# Patient Record
Sex: FEMALE | Race: WHITE | NOT HISPANIC OR LATINO | URBAN - METROPOLITAN AREA
[De-identification: names, ages, dates, MRNs, and addresses within clinical notes are randomized per-mention and may not be internally consistent; named-entity substitution may affect disease eponyms.]

---

## 2020-02-18 ENCOUNTER — OUTPATIENT (OUTPATIENT)
Dept: OUTPATIENT SERVICES | Facility: HOSPITAL | Age: 70
LOS: 1 days | End: 2020-02-18
Payer: MEDICARE

## 2020-02-18 DIAGNOSIS — H26.491 OTHER SECONDARY CATARACT, RIGHT EYE: ICD-10-CM

## 2020-02-18 PROCEDURE — 66821 AFTER CATARACT LASER SURGERY: CPT | Mod: RT

## 2020-06-22 ENCOUNTER — APPOINTMENT (OUTPATIENT)
Dept: HEMATOLOGY ONCOLOGY | Facility: CLINIC | Age: 70
End: 2020-06-22

## 2020-07-08 ENCOUNTER — APPOINTMENT (OUTPATIENT)
Dept: HEMATOLOGY ONCOLOGY | Facility: CLINIC | Age: 70
End: 2020-07-08

## 2020-11-17 ENCOUNTER — APPOINTMENT (OUTPATIENT)
Dept: HEMATOLOGY ONCOLOGY | Facility: CLINIC | Age: 70
End: 2020-11-17
Payer: MEDICARE

## 2020-11-17 DIAGNOSIS — I10 ESSENTIAL (PRIMARY) HYPERTENSION: ICD-10-CM

## 2020-11-17 DIAGNOSIS — Z82.49 FAMILY HISTORY OF ISCHEMIC HEART DISEASE AND OTHER DISEASES OF THE CIRCULATORY SYSTEM: ICD-10-CM

## 2020-11-17 DIAGNOSIS — D58.2 OTHER HEMOGLOBINOPATHIES: ICD-10-CM

## 2020-11-17 DIAGNOSIS — Z83.3 FAMILY HISTORY OF DIABETES MELLITUS: ICD-10-CM

## 2020-11-17 DIAGNOSIS — E11.9 TYPE 2 DIABETES MELLITUS W/OUT COMPLICATIONS: ICD-10-CM

## 2020-11-17 DIAGNOSIS — C50.919 MALIGNANT NEOPLASM OF UNSPECIFIED SITE OF UNSPECIFIED FEMALE BREAST: ICD-10-CM

## 2020-11-17 DIAGNOSIS — E78.00 PURE HYPERCHOLESTEROLEMIA, UNSPECIFIED: ICD-10-CM

## 2020-11-17 DIAGNOSIS — Z80.3 FAMILY HISTORY OF MALIGNANT NEOPLASM OF BREAST: ICD-10-CM

## 2020-11-17 PROCEDURE — 99205 OFFICE O/P NEW HI 60 MIN: CPT | Mod: 95

## 2020-11-17 NOTE — RESULTS/DATA
[FreeTextEntry1] : CBC from 10/17/20:\par WBC 5.34, Hgb 16.9, Hematocrit 51.9, Plt 138, ALC 1.02, ANC 3.23

## 2020-11-17 NOTE — PHYSICAL EXAM
[Fully active, able to carry on all pre-disease performance without restriction] : Status 0 - Fully active, able to carry on all pre-disease performance without restriction [Normal] : affect appropriate [de-identified] : speaking in full sentences [de-identified] : aox3

## 2020-11-17 NOTE — ASSESSMENT
[FreeTextEntry1] : 70F hx left sided Breast ca in 2006 s/p chemotherapy (taxotere, methotrexate and 5FU), radiation and b/l mastectomies in 2012, diabetes, htn, and hypercholesterolemia presenting for an initial consultation for elevated hemoglobin level. \par \par Patient's hemoglobin as of 10/17/20 is mildly elevated above the upper limit of normal. Discussed with the patient that her hemoglobin being elevated is likely secondary to dehydration given clinical presentation and history of fluctuating levels this year. She recently started to change her daily intake of fluid after the most recent CBC and is currently drinking 3 bottles of 64-oz water bottles a day. We recommended daily hydration with 4 water bottles per day, and repeating her labs on December 7th in 3 weeks time. She will then have a followup telehealth visit on December 8th with us. Based on the levels at that time, will determine if additional workup is warranted. \par All questions and concerns addressed to the best of my ability.

## 2020-11-17 NOTE — REVIEW OF SYSTEMS
[Negative] : Allergic/Immunologic [Depression] : depression [Fever] : no fever [Chills] : no chills [Night Sweats] : no night sweats [Fatigue] : no fatigue [Recent Change In Weight] : ~T no recent weight change

## 2020-11-17 NOTE — HISTORY OF PRESENT ILLNESS
[Home] : at home, [unfilled] , at the time of the visit. [Medical Office: ([facility])___] : at the medical office located in  [Verbal consent obtained from patient] : the patient, [unfilled] [de-identified] : 70F hx left sided Breast ca in 2006 s/p chemotherapy (taxotere, methotrexate and 5FU), radiation and b/l mastectomies in 2012, diabetes, htn and hypercholesterolemia presenting for an initial consultation for elevated hemoglobin level. \par Patient reports going for routine blood work in Feb 2020, where she was found to have an elevated hemoglobin. At that time, her PCP recommended to monitor it, and the level fluctuated on subsequent blood draws. The most recent blood draw on 10/17/20 showed that her hgb was 16.9 with hematocrit 51.9. Pt endorses that she has been dehydrated lately and has not been drinking much water. She has recently started to hydrate more which resulted in an improvement in her creatinine. She otherwise denies any headaches, changes in vision, shortness of breath or snoring. She reports her O2 sat at home is 97%. She has no abd pains. She is a non-smoker. Fatigue and appetite levels are good. No recent changes in weight. She denies pruritus when she takes a shower. Denies recent weight loss.\par

## 2020-12-06 ENCOUNTER — LABORATORY RESULT (OUTPATIENT)
Age: 70
End: 2020-12-06

## 2020-12-08 ENCOUNTER — APPOINTMENT (OUTPATIENT)
Dept: HEMATOLOGY ONCOLOGY | Facility: CLINIC | Age: 70
End: 2020-12-08
Payer: MEDICARE

## 2020-12-08 LAB
ALBUMIN SERPL ELPH-MCNC: 4 G/DL
ALP BLD-CCNC: 155 U/L
ALT SERPL-CCNC: 35 U/L
ANION GAP SERPL CALC-SCNC: 12 MMOL/L
AST SERPL-CCNC: 31 U/L
BASOPHILS # BLD AUTO: 0.06 K/UL
BASOPHILS NFR BLD AUTO: 0.7 %
BILIRUB SERPL-MCNC: 0.9 MG/DL
BUN SERPL-MCNC: 14 MG/DL
CA-I SERPL-SCNC: 1.35 MMOL/L
CALCIUM SERPL-MCNC: 10.3 MG/DL
CHLORIDE SERPL-SCNC: 99 MMOL/L
CO2 SERPL-SCNC: 31 MMOL/L
CREAT SERPL-MCNC: 1.41 MG/DL
EOSINOPHIL # BLD AUTO: 0.12 K/UL
EOSINOPHIL NFR BLD AUTO: 1.5 %
GLUCOSE SERPL-MCNC: 51 MG/DL
HCT VFR BLD CALC: 55.1 %
HGB BLD-MCNC: 17 G/DL
IMM GRANULOCYTES NFR BLD AUTO: 0.4 %
LDH SERPL-CCNC: 255 U/L
LYMPHOCYTES # BLD AUTO: 1.06 K/UL
LYMPHOCYTES NFR BLD AUTO: 13 %
MAGNESIUM SERPL-MCNC: 2.1 MG/DL
MAN DIFF?: NORMAL
MCHC RBC-ENTMCNC: 30.9 GM/DL
MCHC RBC-ENTMCNC: 31.8 PG
MCV RBC AUTO: 103.2 FL
MONOCYTES # BLD AUTO: 0.76 K/UL
MONOCYTES NFR BLD AUTO: 9.3 %
NEUTROPHILS # BLD AUTO: 6.14 K/UL
NEUTROPHILS NFR BLD AUTO: 75.1 %
PHOSPHATE SERPL-MCNC: 3.5 MG/DL
PLATELET # BLD AUTO: 137 K/UL
POTASSIUM SERPL-SCNC: 5.1 MMOL/L
PROT SERPL-MCNC: 7.8 G/DL
RBC # BLD: 5.34 M/UL
RBC # FLD: 15 %
SODIUM SERPL-SCNC: 142 MMOL/L
WBC # FLD AUTO: 8.17 K/UL

## 2020-12-08 PROCEDURE — 99215 OFFICE O/P EST HI 40 MIN: CPT | Mod: 95

## 2020-12-08 NOTE — END OF VISIT
normal... Well appearing, awake, alert, oriented to person, place, time/situation and in no apparent distress. [Time Spent: ___ minutes] : I have spent [unfilled] minutes of time on the encounter. [>50% of the face to face encounter time was spent on counseling and/or coordination of care for ___] : Greater than 50% of the face to face encounter time was spent on counseling and/or coordination of care for [unfilled]

## 2020-12-14 ENCOUNTER — RESULT REVIEW (OUTPATIENT)
Age: 70
End: 2020-12-14

## 2020-12-14 ENCOUNTER — APPOINTMENT (OUTPATIENT)
Dept: CT IMAGING | Facility: CLINIC | Age: 70
End: 2020-12-14
Payer: MEDICARE

## 2020-12-14 ENCOUNTER — OUTPATIENT (OUTPATIENT)
Dept: OUTPATIENT SERVICES | Facility: HOSPITAL | Age: 70
LOS: 1 days | End: 2020-12-14
Payer: MEDICARE

## 2020-12-14 DIAGNOSIS — D58.2 OTHER HEMOGLOBINOPATHIES: ICD-10-CM

## 2020-12-14 PROCEDURE — 74177 CT ABD & PELVIS W/CONTRAST: CPT | Mod: 26

## 2020-12-14 PROCEDURE — 82565 ASSAY OF CREATININE: CPT

## 2020-12-14 PROCEDURE — 74177 CT ABD & PELVIS W/CONTRAST: CPT

## 2020-12-14 NOTE — REVIEW OF SYSTEMS
[Depression] : depression [Negative] : Allergic/Immunologic [Fever] : no fever [Chills] : no chills [Night Sweats] : no night sweats [Fatigue] : no fatigue [Recent Change In Weight] : ~T no recent weight change

## 2020-12-14 NOTE — PHYSICAL EXAM
[Fully active, able to carry on all pre-disease performance without restriction] : Status 0 - Fully active, able to carry on all pre-disease performance without restriction [Normal] : affect appropriate [de-identified] : speaking in full sentences [de-identified] : aox3

## 2020-12-14 NOTE — ASSESSMENT
[FreeTextEntry1] : 70F hx left sided Breast ca in 2006 s/p chemotherapy (taxotere, methotrexate and 5FU), radiation and b/l mastectomies in 2012, diabetes, htn, and hypercholesterolemia presenting for follow-up elevated hemoglobin level. after a recent hydration trial. Her labs continue to be elevated, as such, we need to do a formal work-up to rule out PV and other MPD. I have added the following labs: CASSANDRA 2 and epo levels.\par All questions and concerns addressed to the best of my ability. We will talk again next week once results are back.

## 2020-12-14 NOTE — HISTORY OF PRESENT ILLNESS
[Home] : at home, [unfilled] , at the time of the visit. [Medical Office: ([facility])___] : at the medical office located in  [Verbal consent obtained from patient] : the patient, [unfilled] [de-identified] : 70F hx left sided Breast ca in 2006 s/p chemotherapy (taxotere, methotrexate and 5FU), radiation and b/l mastectomies in 2012, diabetes, htn, and hypercholesterolemia presenting for f/u eval for elevated hemoglobin level after initial trial of hydration for a couple of weeks.\par History of present illness: Patient reports going for routine blood work in Feb 2020, where she was found to have an elevated hemoglobin. At that time, her PCP recommended to monitor it, and the level fluctuated on subsequent blood draws. Blood draw on 10/17/20 showed that her hgb was 16.9 with hematocrit 51.9. Pt reported being dehydrated and not drinking much water. She denied any headaches, changes in vision, shortness of breath, or snoring while sleeping. She reports her O2 sat at home is 97%. She has no abd pains. She is a non-smoker. Fatigue and appetite levels are good. No recent changes in weight. She denies pruritus when she takes a shower. Denies recent weight loss.\par  [Spouse] : spouse

## 2020-12-16 ENCOUNTER — APPOINTMENT (OUTPATIENT)
Dept: HEMATOLOGY ONCOLOGY | Facility: CLINIC | Age: 70
End: 2020-12-16

## 2020-12-18 ENCOUNTER — TRANSCRIPTION ENCOUNTER (OUTPATIENT)
Age: 70
End: 2020-12-18

## 2020-12-18 ENCOUNTER — LABORATORY RESULT (OUTPATIENT)
Age: 70
End: 2020-12-18

## 2020-12-18 ENCOUNTER — APPOINTMENT (OUTPATIENT)
Dept: HEMATOLOGY ONCOLOGY | Facility: CLINIC | Age: 70
End: 2020-12-18
Payer: MEDICARE

## 2020-12-18 PROCEDURE — 99442: CPT | Mod: 95

## 2020-12-21 ENCOUNTER — LABORATORY RESULT (OUTPATIENT)
Age: 70
End: 2020-12-21

## 2020-12-22 ENCOUNTER — LABORATORY RESULT (OUTPATIENT)
Age: 70
End: 2020-12-22

## 2020-12-23 ENCOUNTER — LABORATORY RESULT (OUTPATIENT)
Age: 70
End: 2020-12-23

## 2020-12-23 LAB
ALP BLD-CCNC: 152 U/L
CA-I SERPL-SCNC: 1.38 MMOL/L
VIT B12 SERPL-MCNC: 806 PG/ML

## 2020-12-28 ENCOUNTER — APPOINTMENT (OUTPATIENT)
Dept: HEMATOLOGY ONCOLOGY | Facility: CLINIC | Age: 70
End: 2020-12-28
Payer: MEDICARE

## 2020-12-28 PROCEDURE — 99215 OFFICE O/P EST HI 40 MIN: CPT | Mod: 95

## 2020-12-28 NOTE — HISTORY OF PRESENT ILLNESS
[Home] : at home, [unfilled] , at the time of the visit. [Medical Office: ([facility])___] : at the medical office located in  [Spouse] : spouse [Verbal consent obtained from patient] : the patient, [unfilled] [de-identified] : 70F hx left sided Breast ca in 2006 s/p chemotherapy (taxotere, methotrexate and 5FU), radiation and b/l mastectomies in 2012, diabetes, htn, and hypercholesterolemia presenting for f/u eval for elevated hemoglobin level.\par History of present illness: Patient reports going for routine blood work in Feb 2020, where she was found to have an elevated hemoglobin. At that time, her PCP recommended to monitor it, and the level fluctuated on subsequent blood draws. Blood draw on 10/17/20 showed that her hgb was 16.9 with hematocrit 51.9. She denied any headaches, changes in vision, shortness of breath, or snoring while sleeping. She reports her O2 sat at home is 97%. She has no abd pains. She is a non-smoker and not exposed to second hand smoke. Fatigue and appetite levels are good. No recent changes in weight. She denies pruritus when she takes a shower. Denies recent weight loss.\par

## 2020-12-28 NOTE — PHYSICAL EXAM
[Fully active, able to carry on all pre-disease performance without restriction] : Status 0 - Fully active, able to carry on all pre-disease performance without restriction [Normal] : affect appropriate [de-identified] : speaking in full sentences [de-identified] : anicteric [de-identified] : aox3

## 2020-12-28 NOTE — REVIEW OF SYSTEMS
[Negative] : Allergic/Immunologic [Fever] : no fever [Chills] : no chills [Night Sweats] : no night sweats [Fatigue] : no fatigue [Recent Change In Weight] : ~T no recent weight change

## 2020-12-31 LAB — T(9;22)(ABL1,BCR)/CONTROL BLD/T: NORMAL

## 2021-01-07 ENCOUNTER — APPOINTMENT (OUTPATIENT)
Dept: HEMATOLOGY ONCOLOGY | Facility: CLINIC | Age: 71
End: 2021-01-07
Payer: MEDICARE

## 2021-01-07 PROCEDURE — 99442: CPT | Mod: 95

## 2021-01-09 ENCOUNTER — APPOINTMENT (OUTPATIENT)
Dept: MRI IMAGING | Facility: CLINIC | Age: 71
End: 2021-01-09

## 2021-01-13 ENCOUNTER — APPOINTMENT (OUTPATIENT)
Dept: HEMATOLOGY ONCOLOGY | Facility: CLINIC | Age: 71
End: 2021-01-13

## 2021-04-13 ENCOUNTER — APPOINTMENT (OUTPATIENT)
Dept: HEMATOLOGY ONCOLOGY | Facility: CLINIC | Age: 71
End: 2021-04-13
Payer: MEDICARE

## 2021-04-13 PROCEDURE — 99214 OFFICE O/P EST MOD 30 MIN: CPT | Mod: 95

## 2021-05-16 NOTE — PHYSICAL EXAM
[Fully active, able to carry on all pre-disease performance without restriction] : Status 0 - Fully active, able to carry on all pre-disease performance without restriction [Normal] : affect appropriate [de-identified] : speaking in full sentences. Weight 174#, height 5 feet 4. BMI 30 [de-identified] : anicteric [de-identified] : aox3

## 2021-05-16 NOTE — HISTORY OF PRESENT ILLNESS
[Home] : at home, [unfilled] , at the time of the visit. [Medical Office: ([facility])___] : at the medical office located in  [Verbal consent obtained from patient] : the patient, [unfilled] [de-identified] : 71F hx left sided Breast ca in 2006 s/p chemotherapy (taxotere, methotrexate and 5FU), radiation and b/l mastectomies in 2012, diabetes, htn, and hypercholesterolemia presenting for f/u eval for elevated hemoglobin level of unclear etiology.\par \par History of present illness: Patient reports going for routine blood work in Feb 2020, where she was found to have an elevated hemoglobin. At that time, her PCP recommended to monitor it, and the level fluctuated on subsequent blood draws. Blood draw on 10/17/20 showed that her hgb was 16.9 with hematocrit 51.9. She denied any headaches, changes in vision, shortness of breath, or snoring while sleeping. She reports her O2 sat at home is 97%. She has no abd pains. She is a non-smoker and not exposed to second hand smoke. Fatigue and appetite levels are good. No recent changes in weight. She denies pruritus when she takes a shower. Denies recent weight loss. Due to ongoing pandemic, she has declined to do further work-up at this time. She prefers to wait until the pandemic is better controlled since she is asymptomatic. She received both doses of COVID vaccines, second dose 2/19/21.\par

## 2021-07-21 ENCOUNTER — APPOINTMENT (OUTPATIENT)
Dept: HEMATOLOGY ONCOLOGY | Facility: CLINIC | Age: 71
End: 2021-07-21
Payer: MEDICARE

## 2021-07-21 VITALS
SYSTOLIC BLOOD PRESSURE: 144 MMHG | HEART RATE: 76 BPM | BODY MASS INDEX: 30.39 KG/M2 | WEIGHT: 178 LBS | HEIGHT: 64 IN | TEMPERATURE: 98.2 F | OXYGEN SATURATION: 91 % | DIASTOLIC BLOOD PRESSURE: 74 MMHG

## 2021-07-21 DIAGNOSIS — E03.9 HYPOTHYROIDISM, UNSPECIFIED: ICD-10-CM

## 2021-07-21 DIAGNOSIS — Z00.00 ENCOUNTER FOR GENERAL ADULT MEDICAL EXAMINATION W/OUT ABNORMAL FINDINGS: ICD-10-CM

## 2021-07-21 DIAGNOSIS — F41.9 ANXIETY DISORDER, UNSPECIFIED: ICD-10-CM

## 2021-07-21 DIAGNOSIS — N28.89 OTHER SPECIFIED DISORDERS OF KIDNEY AND URETER: ICD-10-CM

## 2021-07-21 PROCEDURE — 99215 OFFICE O/P EST HI 40 MIN: CPT

## 2021-07-21 PROCEDURE — 85025 COMPLETE CBC W/AUTO DIFF WBC: CPT

## 2021-07-21 RX ORDER — LEVOTHYROXINE SODIUM 88 UG/1
88 TABLET ORAL
Refills: 0 | Status: ACTIVE | COMMUNITY
Start: 2020-11-17

## 2021-07-21 RX ORDER — ASPIRIN ENTERIC COATED TABLETS 81 MG 81 MG/1
81 TABLET, DELAYED RELEASE ORAL
Qty: 90 | Refills: 1 | Status: ACTIVE | COMMUNITY
Start: 2020-11-17

## 2021-07-21 RX ORDER — METOPROLOL SUCCINATE 200 MG/1
200 TABLET, EXTENDED RELEASE ORAL
Refills: 0 | Status: ACTIVE | COMMUNITY
Start: 2020-11-17

## 2021-07-21 RX ORDER — UBIDECARENONE 60 MG
60 CAPSULE ORAL
Refills: 0 | Status: ACTIVE | COMMUNITY
Start: 2021-07-21

## 2021-07-21 RX ORDER — CHROMIUM 200 MCG
25 MCG TABLET ORAL
Refills: 0 | Status: ACTIVE | COMMUNITY
Start: 2021-07-21

## 2021-07-21 RX ORDER — FUROSEMIDE 20 MG/1
20 TABLET ORAL
Refills: 0 | Status: ACTIVE | COMMUNITY
Start: 2021-07-21

## 2021-07-21 RX ORDER — DULOXETINE HCL 100 %
POWDER (GRAM) MISCELLANEOUS
Refills: 0 | Status: ACTIVE | COMMUNITY
Start: 2020-11-17

## 2021-07-21 RX ORDER — INSULIN LISPRO 100 [IU]/ML
100 INJECTION, SOLUTION INTRAVENOUS; SUBCUTANEOUS
Refills: 0 | Status: ACTIVE | COMMUNITY
Start: 2020-11-17

## 2021-07-21 RX ORDER — DILTIAZEM HYDROCHLORIDE 360 MG/1
360 CAPSULE, COATED, EXTENDED RELEASE ORAL
Refills: 0 | Status: ACTIVE | COMMUNITY
Start: 2020-11-17

## 2021-07-21 RX ORDER — ROSUVASTATIN CALCIUM 20 MG/1
20 TABLET, FILM COATED ORAL
Refills: 0 | Status: ACTIVE | COMMUNITY
Start: 2020-11-17

## 2021-07-21 RX ORDER — FOLIC ACID/MULTIVIT,IRON,MINER .4-18-35
TABLET,CHEWABLE ORAL
Refills: 0 | Status: ACTIVE | COMMUNITY
Start: 2021-07-21

## 2021-07-21 RX ORDER — OMEGA-3/DHA/EPA/FISH OIL 300-1000MG
1000 CAPSULE ORAL
Refills: 0 | Status: ACTIVE | COMMUNITY
Start: 2021-07-21

## 2021-07-21 RX ORDER — LOSARTAN POTASSIUM 50 MG/1
50 TABLET, FILM COATED ORAL
Refills: 0 | Status: ACTIVE | COMMUNITY
Start: 2021-07-21

## 2021-07-25 LAB
ALBUMIN SERPL ELPH-MCNC: 3.9 G/DL
ALBUMIN SERPL ELPH-MCNC: 4 G/DL
ALP BLD-CCNC: 129 U/L
ALP BLD-CCNC: 136 U/L
ALT SERPL-CCNC: 30 U/L
ALT SERPL-CCNC: 35 U/L
ANION GAP SERPL CALC-SCNC: 11 MMOL/L
ANION GAP SERPL CALC-SCNC: 7 MMOL/L
AST SERPL-CCNC: 29 U/L
AST SERPL-CCNC: 37 U/L
BILIRUB SERPL-MCNC: 0.8 MG/DL
BILIRUB SERPL-MCNC: 0.9 MG/DL
BUN SERPL-MCNC: 15 MG/DL
BUN SERPL-MCNC: 17 MG/DL
CALCIUM SERPL-MCNC: 10.4 MG/DL
CALCIUM SERPL-MCNC: 10.7 MG/DL
CHLORIDE SERPL-SCNC: 101 MMOL/L
CHLORIDE SERPL-SCNC: 102 MMOL/L
CO2 SERPL-SCNC: 29 MMOL/L
CO2 SERPL-SCNC: 32 MMOL/L
CREAT SERPL-MCNC: 1.44 MG/DL
CREAT SERPL-MCNC: 1.5 MG/DL
GLUCOSE SERPL-MCNC: 108 MG/DL
GLUCOSE SERPL-MCNC: 160 MG/DL
LDH SERPL-CCNC: 233 U/L
MAGNESIUM SERPL-MCNC: 2.3 MG/DL
PHOSPHATE SERPL-MCNC: 3.2 MG/DL
POTASSIUM SERPL-SCNC: 5.2 MMOL/L
POTASSIUM SERPL-SCNC: 5.5 MMOL/L
POTASSIUM SERPL-SCNC: 6.3 MMOL/L
PROT SERPL-MCNC: 7.3 G/DL
PROT SERPL-MCNC: 7.4 G/DL
SODIUM SERPL-SCNC: 141 MMOL/L
SODIUM SERPL-SCNC: 141 MMOL/L
URATE SERPL-MCNC: 6 MG/DL

## 2021-07-26 NOTE — ASSESSMENT
[FreeTextEntry1] : 71F hx left sided Breast ca in 2006 s/p chemotherapy (taxotere, methotrexate and 5FU), radiation and b/l mastectomies in 2011, diabetes, HTN, hypercholesterolemia, and anxiety presenting for follow up evaluation of erythrocytosis, r/o polycythemia.\par \par JAK2 V617F mutation negative in 12/6/21. Her EPO levels are normal (high range). Counts today are stable, but Hgb remains elevated. She reports hx of low plts following chemotherapy for her breast caner.\par \par #Polycythemia\par -Previously discussed conducting work up to r/o secondary polycythemia; notably, her O2Sat was in the low 90s in clinic. Ms Sy attributes this to holding her breath due to anxiety when having her vitals checked, but it raised the suspicion that she may have component of underlying oxygenation defect (either pulmonary or cardiac shunt).\par -She reports she had a recent echocardiogram which was normal; requested copy of echo to review\par -Recommended a sleep study to r/o sleep apnea and hypoxia while asleep; screening for symptoms was negative (denies snoring, no daytime somnolence, denies forgetfulness, and denies any AM headaches; she does, however, endorse day time fatigue). Will provide referral. Risk factor includes obesity, and she evidence already of metabolic syndrome (HTN, HLD, T2DM)\par -She is currently on ASA given ASCVD risk factors as above; discussed that would still want to r/o underlying cause of polycythemia; if she does have PV, would need closer monitoring, given the risk that it can progress to myelofibrosis or even AML, though reassured Ms. Sy that this is uncommon, and would pursue secondary etiologies first\par -She is agreeable with pursuing an MRI to evaluate the renal lesion previously identified; EPO level is normal, but would still want to r/o renal neoplasm; explained to Mrs. Sy that they can result in paraneoplastic syndromes, including polycythemia\par -Will arrange for trial of phlebotomy for now given that hematocrit >50%; consented for procedure, scanned into chart; to be done at [facility] on 7/31/21\par \par All questions and concerns addressed to the best of my ability and to her apparent satisfaction. We will talk again once results of testing are back.\par \par Discussed with Dr. Tripathi.\par \par Stalin Camacho MD, MPH\par Hematology / Oncology Fellow, [id number]

## 2021-07-26 NOTE — PHYSICAL EXAM
[Fully active, able to carry on all pre-disease performance without restriction] : Status 0 - Fully active, able to carry on all pre-disease performance without restriction [Normal] : grossly intact [Ulcers] : no ulcers [Mucositis] : no mucositis [Thrush] : no thrush [Vesicles] : no vesicles [de-identified] : speaking in full sentences [de-identified] : anicteric [de-identified] : No wheezing [de-identified] : No palpable masses or HSM; nontender [de-identified] : Edson [de-identified] : aox3

## 2021-07-26 NOTE — HISTORY OF PRESENT ILLNESS
[de-identified] : 71F hx left sided Breast ca in 2006 s/p chemotherapy (taxotere, methotrexate and 5FU), radiation and b/l mastectomies in 2012, diabetes, htn, and hypercholesterolemia presenting for f/u eval for elevated hemoglobin level of unclear etiology.\par \par History of present illness: Patient reports going for routine blood work in Feb 2020, where she was found to have an elevated hemoglobin. At that time, her PCP recommended to monitor it, and the level fluctuated on subsequent blood draws. Blood draw on 10/17/20 showed that her hgb was 16.9 with hematocrit 51.9. She denied any headaches, changes in vision, shortness of breath, or snoring while sleeping. She reports her O2 sat at home is 97%. She has no abd pains. She is a non-smoker and not exposed to second hand smoke. Fatigue and appetite levels are good. No recent changes in weight. She denies pruritus when she takes a shower. Denies recent weight loss. Due to ongoing pandemic, she has declined to do further work-up at this time. She prefers to wait until the pandemic is better controlled since she is asymptomatic. She received both doses of COVID vaccines, second dose 2/19/21.\par  [FreeTextEntry1] : Treatment [facility] [de-identified] : 7/21/21: Mrs. Sy presents today for a follow up visit. She is accompanied by her . History obtained by fellow and medical student.\par \par Since her last visit, Mrs Sy denies any new health interval events (hospitalizations, ER visits, procedures, etc). She is seen today for further evaluation of polycythemia; JAK2 testing previously negative. She has not had a sleep study as previously recommended, and she has not had an MRI to evaluate the renal lesion that was identified on prior imaging. Only major symptom this visit that she endorses at this time is anxiety, which she states is worse whenever she has to come to physician's appointments. She denies any symptoms this visit, including fevers, chills, fatigue, unintentional weight loss, early satiety, abdominal pain, cough, shortness of breath, chest pain, lower extremity edema, and skin changes. She denies any itching following hot showers. She denies any hx of clots. She follows with a cardiologist, last seen 1 week ago, reports had a normal echocardiogram.

## 2021-07-29 ENCOUNTER — OUTPATIENT (OUTPATIENT)
Dept: OUTPATIENT SERVICES | Facility: HOSPITAL | Age: 71
LOS: 1 days | Discharge: HOME | End: 2021-07-29

## 2021-07-31 ENCOUNTER — RESULT REVIEW (OUTPATIENT)
Age: 71
End: 2021-07-31

## 2021-07-31 ENCOUNTER — APPOINTMENT (OUTPATIENT)
Dept: INFUSION THERAPY | Facility: HOSPITAL | Age: 71
End: 2021-07-31

## 2021-07-31 LAB
BASOPHILS # BLD AUTO: 0.06 K/UL — SIGNIFICANT CHANGE UP (ref 0–0.2)
BASOPHILS NFR BLD AUTO: 0.6 % — SIGNIFICANT CHANGE UP (ref 0–2)
EOSINOPHIL # BLD AUTO: 0.18 K/UL — SIGNIFICANT CHANGE UP (ref 0–0.5)
EOSINOPHIL NFR BLD AUTO: 1.8 % — SIGNIFICANT CHANGE UP (ref 0–6)
HCT VFR BLD CALC: 52 % — HIGH (ref 34.5–45)
HGB BLD-MCNC: 17.1 G/DL — HIGH (ref 11.5–15.5)
IMM GRANULOCYTES NFR BLD AUTO: 1 % — SIGNIFICANT CHANGE UP (ref 0–1.5)
LYMPHOCYTES # BLD AUTO: 1.18 K/UL — SIGNIFICANT CHANGE UP (ref 1–3.3)
LYMPHOCYTES # BLD AUTO: 11.9 % — LOW (ref 13–44)
MCHC RBC-ENTMCNC: 32.1 PG — SIGNIFICANT CHANGE UP (ref 27–34)
MCHC RBC-ENTMCNC: 32.9 G/DL — SIGNIFICANT CHANGE UP (ref 32–36)
MCV RBC AUTO: 97.7 FL — SIGNIFICANT CHANGE UP (ref 80–100)
MONOCYTES # BLD AUTO: 0.84 K/UL — SIGNIFICANT CHANGE UP (ref 0–0.9)
MONOCYTES NFR BLD AUTO: 8.5 % — SIGNIFICANT CHANGE UP (ref 2–14)
NEUTROPHILS # BLD AUTO: 7.54 K/UL — HIGH (ref 1.8–7.4)
NEUTROPHILS NFR BLD AUTO: 76.2 % — SIGNIFICANT CHANGE UP (ref 43–77)
NRBC # BLD: 0 /100 WBCS — SIGNIFICANT CHANGE UP (ref 0–0)
PLATELET # BLD AUTO: 132 K/UL — LOW (ref 150–400)
RBC # BLD: 5.32 M/UL — HIGH (ref 3.8–5.2)
RBC # FLD: 14.2 % — SIGNIFICANT CHANGE UP (ref 10.3–14.5)
WBC # BLD: 9.9 K/UL — SIGNIFICANT CHANGE UP (ref 3.8–10.5)
WBC # FLD AUTO: 9.9 K/UL — SIGNIFICANT CHANGE UP (ref 3.8–10.5)

## 2021-08-02 DIAGNOSIS — D45 POLYCYTHEMIA VERA: ICD-10-CM

## 2021-08-09 ENCOUNTER — RESULT REVIEW (OUTPATIENT)
Age: 71
End: 2021-08-09

## 2021-09-07 ENCOUNTER — APPOINTMENT (OUTPATIENT)
Dept: HEMATOLOGY ONCOLOGY | Facility: CLINIC | Age: 71
End: 2021-09-07
Payer: MEDICARE

## 2021-09-07 VITALS
BODY MASS INDEX: 30.9 KG/M2 | WEIGHT: 180 LBS | SYSTOLIC BLOOD PRESSURE: 140 MMHG | HEART RATE: 76 BPM | DIASTOLIC BLOOD PRESSURE: 80 MMHG | OXYGEN SATURATION: 94 %

## 2021-09-07 PROCEDURE — 99215 OFFICE O/P EST HI 40 MIN: CPT

## 2021-09-07 PROCEDURE — 85025 COMPLETE CBC W/AUTO DIFF WBC: CPT

## 2021-09-07 NOTE — ASSESSMENT
[FreeTextEntry1] : 71F hx left sided Breast ca in 2006 s/p chemotherapy (taxotere, methotrexate and 5FU), radiation and b/l mastectomies in 2011, diabetes, HTN, hypercholesterolemia, and anxiety presenting for follow up evaluation of erythrocytosis, r/o polycythemia.\par \par JAK2 V617F mutation negative in 12/6/21. Her EPO levels are normal (high range). Counts today are stable, but Hgb remains elevated. She reports hx of low plts following chemotherapy for her breast caner.\par \par #Polycythemia\par -Previously discussed conducting work up to r/o secondary polycythemia; notably, her O2Sat was in the low 90s in clinic. Ms Sy attributes this to holding her breath due to anxiety when having her vitals checked, but it raised the suspicion that she may have component of underlying oxygenation defect (either pulmonary or cardiac shunt).\par -She reports she had a recent echocardiogram which was normal; requested copy of echo to review\par -Recommended a sleep study to r/o sleep apnea and hypoxia while asleep; screening for symptoms was negative (denies snoring, no daytime somnolence, denies forgetfulness, and denies any AM headaches; she does, however, endorse day time fatigue). Will provide referral. Risk factor includes obesity, and she evidence already of metabolic syndrome (HTN, HLD, T2DM)\par -She is currently on ASA given ASCVD risk factors as above; discussed that would still want to r/o underlying cause of polycythemia; if she does have PV, would need closer monitoring, given the risk that it can progress to myelofibrosis or even AML, though reassured Ms. Sy that this is uncommon, and would pursue secondary etiologies first\par -She is agreeable with pursuing an MRI to evaluate the renal lesion previously identified; EPO level is normal, but would still want to r/o renal neoplasm; explained to Mrs. Sy that they can result in paraneoplastic syndromes, including polycythemia\par -Pt is s/p phlebotomy on 7/21/21, hct currently at 47.6\par \par F/u in 3 months\par

## 2021-09-07 NOTE — PHYSICAL EXAM
[Fully active, able to carry on all pre-disease performance without restriction] : Status 0 - Fully active, able to carry on all pre-disease performance without restriction [Normal] : affect appropriate [Ulcers] : no ulcers [Mucositis] : no mucositis [Thrush] : no thrush [Vesicles] : no vesicles [de-identified] : speaking in full sentences [de-identified] : anicteric [de-identified] : No wheezing [de-identified] : No palpable masses or HSM; nontender [de-identified] : Edson [de-identified] : aox3

## 2021-09-07 NOTE — RESULTS/DATA
[FreeTextEntry1] : cbc done 8/30/21\par wbc- 7.7\par hgb- 15.8\par hct - 47.6\par plt - 123\par alc - 0.89\par anc - 5.80

## 2021-09-07 NOTE — HISTORY OF PRESENT ILLNESS
[de-identified] : 71F hx left sided Breast ca in 2006 s/p chemotherapy (taxotere, methotrexate and 5FU), radiation and b/l mastectomies in 2012, diabetes, htn, and hypercholesterolemia presenting for f/u eval for elevated hemoglobin level of unclear etiology.\par \par History of present illness: Patient reports going for routine blood work in Feb 2020, where she was found to have an elevated hemoglobin. At that time, her PCP recommended to monitor it, and the level fluctuated on subsequent blood draws. Blood draw on 10/17/20 showed that her hgb was 16.9 with hematocrit 51.9. She denied any headaches, changes in vision, shortness of breath, or snoring while sleeping. She reports her O2 sat at home is 97%. She has no abd pains. She is a non-smoker and not exposed to second hand smoke. Fatigue and appetite levels are good. No recent changes in weight. She denies pruritus when she takes a shower. Denies recent weight loss. Due to ongoing pandemic, she has declined to do further work-up at this time. She prefers to wait until the pandemic is better controlled since she is asymptomatic. She received both doses of COVID vaccines, second dose 2/19/21.\par  [FreeTextEntry1] : Treatment [facility] [de-identified] : 9/7/21: Mrs. Sy presents today for a follow up visit. She is accompanied by her . \par \par Since her last visit, pt underwent phlebotomy on 7/21/21. She reports feeling overall more energetic afterwards. She has not had a sleep study as previously recommended, and she has not had an MRI to evaluate the renal lesion that was identified on prior imaging. Only major symptom this visit that she endorses at this time is anxiety, which she states is worse whenever she has to come to physician's appointments. She denies any symptoms this visit, including fevers, chills, fatigue, unintentional weight loss, early satiety, abdominal pain, cough, shortness of breath, chest pain, lower extremity edema, and skin changes. She denies any itching following hot showers. She denies any hx of clots. She had her annual physical last week and labs from 8/30/31 were reviewed - hct now at 47.6

## 2021-09-21 ENCOUNTER — APPOINTMENT (OUTPATIENT)
Dept: MRI IMAGING | Facility: CLINIC | Age: 71
End: 2021-09-21
Payer: MEDICARE

## 2021-09-21 ENCOUNTER — OUTPATIENT (OUTPATIENT)
Dept: OUTPATIENT SERVICES | Facility: HOSPITAL | Age: 71
LOS: 1 days | End: 2021-09-21
Payer: MEDICARE

## 2021-09-21 DIAGNOSIS — D58.2 OTHER HEMOGLOBINOPATHIES: ICD-10-CM

## 2021-09-21 DIAGNOSIS — N28.89 OTHER SPECIFIED DISORDERS OF KIDNEY AND URETER: ICD-10-CM

## 2021-09-21 PROCEDURE — A9585: CPT

## 2021-09-21 PROCEDURE — 72197 MRI PELVIS W/O & W/DYE: CPT

## 2021-09-21 PROCEDURE — 74183 MRI ABD W/O CNTR FLWD CNTR: CPT | Mod: 26,MH

## 2021-09-21 PROCEDURE — 72197 MRI PELVIS W/O & W/DYE: CPT | Mod: 26,MH

## 2021-09-21 PROCEDURE — 74183 MRI ABD W/O CNTR FLWD CNTR: CPT

## 2021-10-05 ENCOUNTER — APPOINTMENT (OUTPATIENT)
Dept: HEMATOLOGY ONCOLOGY | Facility: CLINIC | Age: 71
End: 2021-10-05
Payer: MEDICARE

## 2021-10-05 PROCEDURE — 99441: CPT | Mod: 95

## 2021-10-06 PROBLEM — I10 ESSENTIAL HYPERTENSION: Status: ACTIVE | Noted: 2020-11-17

## 2022-03-28 ENCOUNTER — APPOINTMENT (OUTPATIENT)
Dept: HEMATOLOGY ONCOLOGY | Facility: CLINIC | Age: 72
End: 2022-03-28
Payer: MEDICARE

## 2022-03-28 PROCEDURE — 99214 OFFICE O/P EST MOD 30 MIN: CPT | Mod: 25,GC,95

## 2022-03-28 NOTE — HISTORY OF PRESENT ILLNESS
[de-identified] : 71F hx left sided Breast ca in 2006 s/p chemotherapy (taxotere, methotrexate and 5FU), radiation and b/l mastectomies in 2012, diabetes, htn, and hypercholesterolemia presenting for f/u eval for elevated hemoglobin level of unclear etiology.\par \par History of present illness: Patient reports going for routine blood work in Feb 2020, where she was found to have an elevated hemoglobin. At that time, her PCP recommended to monitor it, and the level fluctuated on subsequent blood draws. Blood draw on 10/17/20 showed that her hgb was 16.9 with hematocrit 51.9. She denied any headaches, changes in vision, shortness of breath, or snoring while sleeping. She reports her O2 sat at home is 97%. She has no abd pains. She is a non-smoker and not exposed to second hand smoke. Fatigue and appetite levels are good. No recent changes in weight. She denies pruritus when she takes a shower. Denies recent weight loss. Due to ongoing pandemic, she has declined to do further work-up at this time. She prefers to wait until the pandemic is better controlled since she is asymptomatic. She received both doses of COVID vaccines, second dose 2/19/21.\par \par Pt presents today for follow up. She denies development of any new symptoms and reports feeling well. She continues the aspirin and is tolerating it well.  [FreeTextEntry1] : Treatment [facility]ive [de-identified] : 9/7/21: Mrs. Sy presents today for a follow up visit. She is accompanied by her . Since her last visit, pt underwent phlebotomy on 7/21/21. She reports feeling overall more energetic afterwards. She has not had a sleep study as previously recommended, and she has not had an MRI to evaluate the renal lesion that was identified on prior imaging. Only major symptom this visit that she endorses at this time is anxiety, which she states is worse whenever she has to come to physician's appointments. She denies any symptoms this visit, including fevers, chills, fatigue, unintentional weight loss, early satiety, abdominal pain, cough, shortness of breath, chest pain, lower extremity edema, and skin changes. She denies any itching following hot showers. She denies any hx of clots. She had her annual physical last week and labs from 8/30/31 were reviewed - hct now at 47.6\par \par 3/28/22: Telehealth. Over this interval - the patient reports no complaints or symptoms. She denies fever, chills, night sweats, palpable lymph nodes, change in weight loss, unintentional weight loss, dizziness, lightheadedness, vision changes, claudication, chest pain, shortness of breath, confusion, easy bruising, gum bleeds, change in bowel or urinary function, recent infections. She endorses breast cancer when she was younger and family history of breast cancer but denies family history of hematologic malignancies. She is currently on aspirin and tolerating it well. \par

## 2022-03-28 NOTE — ASSESSMENT
[FreeTextEntry1] : 71F hx left sided Breast ca in 2006 s/p chemotherapy (taxotere, methotrexate and 5FU), radiation and b/l mastectomies in 2011, diabetes, HTN, hypercholesterolemia, and anxiety presenting for follow up evaluation of erythrocytosis, r/o polycythemia.\par \par JAK2 V617F mutation negative in 12/6/21. Her EPO levels are normal (high range). Counts today are stable, but Hgb remains elevated. She reports hx of low plts following chemotherapy for her breast caner.\par \par #Polycythemia\par -Previously discussed conducting work up to r/o secondary polycythemia; notably, her O2 Sat was in the low 90s in clinic. Ms Sy attributes this to holding her breath due to anxiety when having her vitals checked, but it raised the suspicion that she may have component of underlying oxygenation defect (either pulmonary or cardiac shunt).\par -She reports she had a recent echocardiogram which was normal; requested copy of echo to review\par -Recommended a sleep study to r/o sleep apnea and hypoxia while asleep; screening for symptoms was negative (denies snoring, no daytime somnolence, denies forgetfulness, and denies any AM headaches; she does, however, endorse day time fatigue). Will provide referral. Risk factor includes obesity, and she evidence already of metabolic syndrome (HTN, HLD, T2DM)\par -She is currently on ASA given ASCVD risk factors as above; discussed that would still want to r/o underlying cause of polycythemia; if she does have PV, would need closer monitoring, given the risk that it can progress to myelofibrosis or even AML, though reassured Ms. Sy that this is uncommon, and would pursue secondary etiologies first\par -She is agreeable with pursuing an MRI to evaluate the renal lesion previously identified; EPO level is normal, but would still want to r/o renal neoplasm; explained to Mrs. Sy that they can result in paraneoplastic syndromes, including polycythemia, MRI overall unremarkable \par -Pt is s/p phlebotomy on 7/21/21, hct currently at 52.1 \par -will repeat EPO level, send CASSANDRA Exon 12, MPL and CALR mutations to be done [facility] next labs \par \par F/u in 3 months\par

## 2022-03-28 NOTE — END OF VISIT
[Time Spent: ___ minutes] : I have spent [unfilled] minutes of time on the encounter. [] : A student assisted with documenting this visit. I have reviewed and verified all information documented by the student, and made modifications to such information, when appropriate. [FreeTextEntry3] : Patient seen and case discused with MS3 Bisgenevieve. 73 yo female with polcythemia, likely secondary. Recommend sleep study, will send further genetic work up and repeat erythropoieten level. Continue ASA

## 2022-03-28 NOTE — PHYSICAL EXAM
[Fully active, able to carry on all pre-disease performance without restriction] : Status 0 - Fully active, able to carry on all pre-disease performance without restriction [Normal] : affect appropriate [Ulcers] : no ulcers [Mucositis] : no mucositis [Thrush] : no thrush [Vesicles] : no vesicles [de-identified] : speaking in full sentences [de-identified] : anicteric [de-identified] : No wheezing [de-identified] : No palpable masses or HSM; nontender [de-identified] : Edson [de-identified] : aox3

## 2022-06-17 ENCOUNTER — OFFICE (OUTPATIENT)
Dept: URBAN - METROPOLITAN AREA CLINIC 104 | Facility: CLINIC | Age: 72
Setting detail: OPHTHALMOLOGY
End: 2022-06-17
Payer: MEDICARE

## 2022-06-17 DIAGNOSIS — H10.433: ICD-10-CM

## 2022-06-17 DIAGNOSIS — S00.212A: ICD-10-CM

## 2022-06-17 PROCEDURE — 92002 INTRM OPH EXAM NEW PATIENT: CPT | Performed by: OPTOMETRIST

## 2022-06-17 ASSESSMENT — VISUAL ACUITY
OD_BCVA: 20/30
OS_BCVA: 20/30

## 2022-06-17 ASSESSMENT — TONOMETRY
OD_IOP_MMHG: 16
OS_IOP_MMHG: 16

## 2022-06-17 ASSESSMENT — CONFRONTATIONAL VISUAL FIELD TEST (CVF)
OS_FINDINGS: FULL
OD_FINDINGS: FULL

## 2022-07-19 ENCOUNTER — OFFICE (OUTPATIENT)
Dept: URBAN - METROPOLITAN AREA CLINIC 70 | Facility: CLINIC | Age: 72
Setting detail: OPHTHALMOLOGY
End: 2022-07-19
Payer: MEDICARE

## 2022-07-19 DIAGNOSIS — H10.45: ICD-10-CM

## 2022-07-19 DIAGNOSIS — Z96.1: ICD-10-CM

## 2022-07-19 DIAGNOSIS — H18.423: ICD-10-CM

## 2022-07-19 DIAGNOSIS — E11.9: ICD-10-CM

## 2022-07-19 PROBLEM — S00.212A ABRASION EYELID; LEFT EYE INITIAL ENCOUNTER: Status: ACTIVE | Noted: 2022-06-17

## 2022-07-19 PROCEDURE — 99213 OFFICE O/P EST LOW 20 MIN: CPT | Performed by: OPHTHALMOLOGY

## 2022-07-19 PROCEDURE — 92025 CPTRIZED CORNEAL TOPOGRAPHY: CPT | Performed by: OPHTHALMOLOGY

## 2022-07-19 ASSESSMENT — KERATOMETRY
OD_K1POWER_DIOPTERS: 44.00
OS_K2POWER_DIOPTERS: 45.50
OS_AXISANGLE_DEGREES: 166
OS_K1POWER_DIOPTERS: 43.50
OD_K2POWER_DIOPTERS: 45.50
OD_AXISANGLE_DEGREES: 013

## 2022-07-19 ASSESSMENT — REFRACTION_CURRENTRX
OD_CYLINDER: -1.50
OS_SPHERE: +0.75
OS_CYLINDER: -1.50
OD_AXIS: 090
OS_OVR_VA: 20/
OD_OVR_VA: 20/
OS_AXIS: 080
OD_SPHERE: +1.50

## 2022-07-19 ASSESSMENT — REFRACTION_MANIFEST
OD_SPHERE: +1.50
OD_VA1: 20/30
OD_VA2: 20/20
OS_VA1: 20/25
OS_SPHERE: +0.75
OS_AXIS: 90
OS_CYLINDER: -1.75
OU_VA: 20/25
OD_ADD: +2.75
OS_VA2: 20/20
OD_AXIS: 110
OS_ADD: +2.75
OD_CYLINDER: -1.25

## 2022-07-19 ASSESSMENT — AXIALLENGTH_DERIVED
OS_AL: 23.2775
OS_AL: 21.8951
OD_AL: 22.8181
OD_AL: 21.9419

## 2022-07-19 ASSESSMENT — CONFRONTATIONAL VISUAL FIELD TEST (CVF)
OS_FINDINGS: FULL
OD_FINDINGS: FULL

## 2022-07-19 ASSESSMENT — REFRACTION_AUTOREFRACTION
OD_AXIS: 110
OS_SPHERE: +4.75
OS_AXIS: 091
OD_CYLINDER: -1.25
OS_CYLINDER: -2.00
OD_SPHERE: +4.00

## 2022-07-19 ASSESSMENT — VISUAL ACUITY
OD_BCVA: 20/30
OS_BCVA: 20/40-2

## 2022-07-19 ASSESSMENT — SPHEQUIV_DERIVED
OD_SPHEQUIV: 3.375
OS_SPHEQUIV: -0.125
OS_SPHEQUIV: 3.75
OD_SPHEQUIV: 0.875

## 2022-07-19 ASSESSMENT — CORNEAL DYSTROPHY - BAND KERATOPATHY
OS_BANDKERATOPATHY: 1+
OD_BANDKERATOPATHY: 2+

## 2023-07-12 ENCOUNTER — APPOINTMENT (OUTPATIENT)
Dept: ORTHOPEDIC SURGERY | Facility: CLINIC | Age: 73
End: 2023-07-12
Payer: MEDICARE

## 2023-07-12 VITALS — WEIGHT: 180 LBS | BODY MASS INDEX: 30.73 KG/M2 | HEIGHT: 64 IN

## 2023-07-12 DIAGNOSIS — S63.502A UNSPECIFIED SPRAIN OF LEFT WRIST, INITIAL ENCOUNTER: ICD-10-CM

## 2023-07-12 DIAGNOSIS — S69.92XS UNSPECIFIED INJURY OF LEFT WRIST, HAND AND FINGER(S), SEQUELA: ICD-10-CM

## 2023-07-12 PROCEDURE — 99203 OFFICE O/P NEW LOW 30 MIN: CPT | Mod: 25

## 2023-07-12 PROCEDURE — 73130 X-RAY EXAM OF HAND: CPT | Mod: LT

## 2023-07-12 PROCEDURE — L3809: CPT

## 2023-07-12 NOTE — ASSESSMENT
[FreeTextEntry1] : will get her in thumb spica brace for activities, ice and nsaids, f/u 1-2 weeks Dr Jones baca possible new xray in light of snuff box tenderness if pain persists

## 2023-07-12 NOTE — IMAGING
[Left] : left wrist [de-identified] : left wrist-no swelling, + ttp snuff box, no ttp distal radius, + pain with flexion and extension which is limited, nvid [FreeTextEntry1] : no fractures [FreeTextEntry8] : no fractures

## 2023-07-12 NOTE — HISTORY OF PRESENT ILLNESS
[10] : 10 [0] : 0 [Localized] : localized [Sharp] : sharp [Intermittent] : intermittent [Retired] : Work status: retired [de-identified] : 7-12-23- she is a left hand dominant female had a fall earlier today injuring the left wrist. notes pain in the snuff box distribution with diminished rom.  [] : Post Surgical Visit: no [FreeTextEntry1] : LT hand [FreeTextEntry3] : 7/12/23 [FreeTextEntry5] : Patient fell down on her left hand on cement in her backyard today 7/12/23, no prior hx [de-identified] : movement

## 2023-07-20 ENCOUNTER — APPOINTMENT (OUTPATIENT)
Dept: ORTHOPEDIC SURGERY | Facility: CLINIC | Age: 73
End: 2023-07-20
Payer: MEDICARE

## 2023-07-20 VITALS — BODY MASS INDEX: 29.02 KG/M2 | WEIGHT: 170 LBS | HEIGHT: 64 IN

## 2023-07-20 DIAGNOSIS — C80.1 MALIGNANT (PRIMARY) NEOPLASM, UNSPECIFIED: ICD-10-CM

## 2023-07-20 DIAGNOSIS — S63.502D UNSPECIFIED SPRAIN OF LEFT WRIST, SUBSEQUENT ENCOUNTER: ICD-10-CM

## 2023-07-20 DIAGNOSIS — I10 ESSENTIAL (PRIMARY) HYPERTENSION: ICD-10-CM

## 2023-07-20 DIAGNOSIS — E11.9 TYPE 2 DIABETES MELLITUS W/OUT COMPLICATIONS: ICD-10-CM

## 2023-07-20 DIAGNOSIS — E78.00 PURE HYPERCHOLESTEROLEMIA, UNSPECIFIED: ICD-10-CM

## 2023-07-20 PROCEDURE — 99213 OFFICE O/P EST LOW 20 MIN: CPT

## 2023-07-20 PROCEDURE — 73110 X-RAY EXAM OF WRIST: CPT | Mod: LT

## 2023-07-20 NOTE — HISTORY OF PRESENT ILLNESS
[4] : 4 [0] : 0 [Sharp] : sharp [Shooting] : shooting [Retired] : Work status: retired [de-identified] : FALL 07/12/23\par \par 07/20/23:  Initial visit for this 73 year old female LHD here today after an injury to her left wrist on 07/12/23.  She went to [facility] urgent care and was diagnosed with a left wrist sprain and repeat x-rays recommended if pain persists. Wearing thumb spica splint. Pain has improved slightly.\par \par PMH: Hx of [place]'s Disease rt wrist\par \par 7-12-23- she is a left hand dominant female had a fall earlier today injuring the left wrist. notes pain in the snuff box distribution with diminished rom.  [] : no [FreeTextEntry1] : left wrist

## 2023-07-20 NOTE — PHYSICAL EXAM
[Normal Mood and Affect] : normal mood and affect [Able to Communicate] : able to communicate [Well Developed] : well developed [Well Nourished] : well nourished [Left] : left hand [Distal Radius] : distal radius [NL (90)] : supination 90 degrees [There are no fractures, subluxations or dislocations. No significant abnormalities are seen] : There are no fractures, subluxations or dislocations. No significant abnormalities are seen [] : no tenderness over hand [FreeTextEntry3] : left wrist-no swelling, slight volar ecchymosis [de-identified] : No anatomic snuffbox tenderness [FreeTextEntry8] : ??? radial styloid [TWNoteComboBox7] : dorsiflexion 30 degrees [TWNoteComboBox4] : volarflexion 60 degrees

## 2023-10-17 ENCOUNTER — RX ONLY (RX ONLY)
Age: 73
End: 2023-10-17

## 2023-10-17 ENCOUNTER — OFFICE (OUTPATIENT)
Dept: URBAN - METROPOLITAN AREA CLINIC 70 | Facility: CLINIC | Age: 73
Setting detail: OPHTHALMOLOGY
End: 2023-10-17
Payer: MEDICARE

## 2023-10-17 DIAGNOSIS — H18.423: ICD-10-CM

## 2023-10-17 PROCEDURE — 92025 CPTRIZED CORNEAL TOPOGRAPHY: CPT | Performed by: OPHTHALMOLOGY

## 2023-10-17 PROCEDURE — 92012 INTRM OPH EXAM EST PATIENT: CPT | Performed by: OPHTHALMOLOGY

## 2023-10-17 ASSESSMENT — REFRACTION_MANIFEST
OD_ADD: +2.75
OS_CYLINDER: -1.75
OS_ADD: +2.75
OS_SPHERE: +0.75
OD_SPHERE: +1.50
OU_VA: 20/25
OS_VA2: 20/20
OD_VA2: 20/20
OS_AXIS: 90
OD_VA1: 20/30
OD_AXIS: 110
OS_VA1: 20/25
OD_CYLINDER: -1.25

## 2023-10-17 ASSESSMENT — CONFRONTATIONAL VISUAL FIELD TEST (CVF)
OD_FINDINGS: FULL
OS_FINDINGS: FULL

## 2023-10-17 ASSESSMENT — REFRACTION_AUTOREFRACTION
OD_SPHERE: +4.25
OS_CYLINDER: -2.00
OS_SPHERE: +4.00
OS_AXIS: 094
OD_AXIS: 080
OD_CYLINDER: -2.50

## 2023-10-17 ASSESSMENT — REFRACTION_CURRENTRX
OD_VPRISM_DIRECTION: PROGS
OD_OVR_VA: 20/
OD_ADD: +2.00
OS_SPHERE: +0.50
OD_CYLINDER: -1.50
OS_VPRISM_DIRECTION: PROGS
OS_OVR_VA: 20/
OS_CYLINDER: -1.50
OD_SPHERE: +1.50
OS_ADD: +2.00
OD_AXIS: 093
OS_AXIS: 087

## 2023-10-17 ASSESSMENT — KERATOMETRY
OD_K1POWER_DIOPTERS: 44.00
OS_AXISANGLE_DEGREES: 175
OD_K2POWER_DIOPTERS: 45.25
OS_K2POWER_DIOPTERS: 45.50
OS_K1POWER_DIOPTERS: 43.50
OD_AXISANGLE_DEGREES: 012

## 2023-10-17 ASSESSMENT — SPHEQUIV_DERIVED
OS_SPHEQUIV: -0.125
OS_SPHEQUIV: 3
OD_SPHEQUIV: 3
OD_SPHEQUIV: 0.875

## 2023-10-17 ASSESSMENT — AXIALLENGTH_DERIVED
OS_AL: 22.1497
OD_AL: 22.1093
OS_AL: 23.2775
OD_AL: 22.8611

## 2023-10-17 ASSESSMENT — CORNEAL DYSTROPHY - BAND KERATOPATHY
OS_BANDKERATOPATHY: 1+
OD_BANDKERATOPATHY: 2+

## 2023-10-17 ASSESSMENT — VISUAL ACUITY
OS_BCVA: 20/30+
OD_BCVA: 20/25-2

## 2024-01-27 ENCOUNTER — OFFICE (OUTPATIENT)
Dept: URBAN - METROPOLITAN AREA CLINIC 70 | Facility: CLINIC | Age: 74
Setting detail: OPHTHALMOLOGY
End: 2024-01-27
Payer: MEDICARE

## 2024-01-27 ENCOUNTER — RX ONLY (RX ONLY)
Age: 74
End: 2024-01-27

## 2024-01-27 DIAGNOSIS — H18.423: ICD-10-CM

## 2024-01-27 PROCEDURE — 99213 OFFICE O/P EST LOW 20 MIN: CPT | Performed by: OPHTHALMOLOGY

## 2024-01-27 ASSESSMENT — REFRACTION_AUTOREFRACTION
OD_SPHERE: +4.75
OS_AXIS: 077
OD_AXIS: 098
OD_CYLINDER: -1.25
OS_CYLINDER: -2.25
OS_SPHERE: +3.50

## 2024-01-27 ASSESSMENT — REFRACTION_CURRENTRX
OS_OVR_VA: 20/
OD_CYLINDER: -1.50
OD_ADD: +2.00
OD_VPRISM_DIRECTION: PROGS
OS_AXIS: 087
OS_SPHERE: +0.50
OD_SPHERE: +1.50
OS_VPRISM_DIRECTION: PROGS
OD_AXIS: 093
OS_ADD: +2.00
OD_OVR_VA: 20/
OS_CYLINDER: -1.50

## 2024-01-27 ASSESSMENT — REFRACTION_MANIFEST
OS_AXIS: 90
OS_VA2: 20/20
OD_AXIS: 110
OD_VA1: 20/30
OU_VA: 20/25
OS_CYLINDER: -1.75
OS_VA1: 20/25
OS_ADD: +2.75
OS_SPHERE: +0.75
OD_SPHERE: +1.50
OD_CYLINDER: -1.25
OD_VA2: 20/20
OD_ADD: +2.75

## 2024-01-27 ASSESSMENT — CORNEAL DYSTROPHY - BAND KERATOPATHY
OD_BANDKERATOPATHY: 2+
OS_BANDKERATOPATHY: 1+

## 2024-01-27 ASSESSMENT — SPHEQUIV_DERIVED
OD_SPHEQUIV: 4.125
OS_SPHEQUIV: -0.125
OS_SPHEQUIV: 2.375
OD_SPHEQUIV: 0.875

## 2024-01-27 ASSESSMENT — CONFRONTATIONAL VISUAL FIELD TEST (CVF)
OD_FINDINGS: FULL
OS_FINDINGS: FULL

## 2024-02-01 ENCOUNTER — APPOINTMENT (OUTPATIENT)
Dept: ORTHOPEDIC SURGERY | Facility: CLINIC | Age: 74
End: 2024-02-01
Payer: MEDICARE

## 2024-02-01 VITALS — HEIGHT: 64 IN | WEIGHT: 170 LBS | BODY MASS INDEX: 29.02 KG/M2

## 2024-02-01 DIAGNOSIS — S16.1XXA STRAIN OF MUSCLE, FASCIA AND TENDON AT NECK LEVEL, INITIAL ENCOUNTER: ICD-10-CM

## 2024-02-01 DIAGNOSIS — M50.90 CERVICAL DISC DISORDER, UNSPECIFIED, UNSPECIFIED CERVICAL REGION: ICD-10-CM

## 2024-02-01 PROCEDURE — 99214 OFFICE O/P EST MOD 30 MIN: CPT

## 2024-02-01 PROCEDURE — 72040 X-RAY EXAM NECK SPINE 2-3 VW: CPT

## 2024-02-01 PROCEDURE — 73030 X-RAY EXAM OF SHOULDER: CPT | Mod: RT

## 2024-02-01 PROCEDURE — 73010 X-RAY EXAM OF SHOULDER BLADE: CPT | Mod: RT

## 2024-02-01 RX ORDER — METHYLPREDNISOLONE 4 MG/1
4 TABLET ORAL
Qty: 1 | Refills: 0 | Status: ACTIVE | COMMUNITY
Start: 2024-02-01 | End: 1900-01-01

## 2024-02-01 NOTE — PHYSICAL EXAM
[Right] : right shoulder [5 ___] : forward flexion 5[unfilled]/5 [5___] : internal rotation 5[unfilled]/5 [] : pain with strength testing [FreeTextEntry9] :  ER 47

## 2024-02-01 NOTE — HISTORY OF PRESENT ILLNESS
[Right Arm] : right arm [de-identified] : 2/1/24: 72 yo LHD female with right shoulder and arm pain. She reports her large puppy jumping on her shoulder about 1/18/24. She reports increased pain with pushing/pulling with the dog. She has pain radiating into her neck. There is pain down her arm and increased when she moves her head. She takes Tylenol  PMHx: DM, HTN, CKD [FreeTextEntry1] : shoulder

## 2024-02-01 NOTE — IMAGING
[Disc space narrowing] : Disc space narrowing [Right] : right shoulder [There are no fractures, subluxations or dislocations. No significant abnormalities are seen] : There are no fractures, subluxations or dislocations. No significant abnormalities are seen

## 2024-02-01 NOTE — ASSESSMENT
[FreeTextEntry1] : Cervical DDD. Signs of cervical radiculopathy. MDP. Heat. Rest. RTO 2-3 weeks with pain management.

## 2024-02-28 ENCOUNTER — APPOINTMENT (OUTPATIENT)
Dept: PAIN MANAGEMENT | Facility: CLINIC | Age: 74
End: 2024-02-28

## 2024-07-27 ENCOUNTER — OFFICE (OUTPATIENT)
Dept: URBAN - METROPOLITAN AREA CLINIC 70 | Facility: CLINIC | Age: 74
Setting detail: OPHTHALMOLOGY
End: 2024-07-27
Payer: MEDICARE

## 2024-07-27 DIAGNOSIS — H18.423: ICD-10-CM

## 2024-07-27 DIAGNOSIS — E11.9: ICD-10-CM

## 2024-07-27 PROCEDURE — 99214 OFFICE O/P EST MOD 30 MIN: CPT | Performed by: OPHTHALMOLOGY

## 2024-07-27 ASSESSMENT — CONFRONTATIONAL VISUAL FIELD TEST (CVF)
OD_FINDINGS: FULL
OS_FINDINGS: FULL

## 2024-10-15 ENCOUNTER — EMERGENCY (EMERGENCY)
Facility: HOSPITAL | Age: 74
LOS: 0 days | Discharge: HOME | End: 2024-10-15
Attending: EMERGENCY MEDICINE
Payer: MEDICARE

## 2024-10-15 VITALS
RESPIRATION RATE: 19 BRPM | DIASTOLIC BLOOD PRESSURE: 79 MMHG | OXYGEN SATURATION: 98 % | SYSTOLIC BLOOD PRESSURE: 145 MMHG | HEART RATE: 75 BPM | TEMPERATURE: 98 F

## 2024-10-15 VITALS — WEIGHT: 171.3 LBS

## 2024-10-15 DIAGNOSIS — N18.30 CHRONIC KIDNEY DISEASE, STAGE 3 UNSPECIFIED: ICD-10-CM

## 2024-10-15 DIAGNOSIS — E87.5 HYPERKALEMIA: ICD-10-CM

## 2024-10-15 DIAGNOSIS — E11.22 TYPE 2 DIABETES MELLITUS WITH DIABETIC CHRONIC KIDNEY DISEASE: ICD-10-CM

## 2024-10-15 DIAGNOSIS — R79.89 OTHER SPECIFIED ABNORMAL FINDINGS OF BLOOD CHEMISTRY: ICD-10-CM

## 2024-10-15 DIAGNOSIS — Z88.0 ALLERGY STATUS TO PENICILLIN: ICD-10-CM

## 2024-10-15 DIAGNOSIS — Z88.2 ALLERGY STATUS TO SULFONAMIDES: ICD-10-CM

## 2024-10-15 DIAGNOSIS — E03.9 HYPOTHYROIDISM, UNSPECIFIED: ICD-10-CM

## 2024-10-15 LAB
ANION GAP SERPL CALC-SCNC: 6 MMOL/L — SIGNIFICANT CHANGE UP (ref 5–17)
BASOPHILS # BLD AUTO: 0.06 K/UL — SIGNIFICANT CHANGE UP (ref 0–0.2)
BASOPHILS NFR BLD AUTO: 0.7 % — SIGNIFICANT CHANGE UP (ref 0–2)
BUN SERPL-MCNC: 22 MG/DL — SIGNIFICANT CHANGE UP (ref 7–23)
CALCIUM SERPL-MCNC: 9.3 MG/DL — SIGNIFICANT CHANGE UP (ref 8.5–10.1)
CHLORIDE SERPL-SCNC: 104 MMOL/L — SIGNIFICANT CHANGE UP (ref 96–108)
CO2 SERPL-SCNC: 33 MMOL/L — HIGH (ref 22–31)
CREAT SERPL-MCNC: 1.57 MG/DL — HIGH (ref 0.5–1.3)
EGFR: 35 ML/MIN/1.73M2 — LOW
EOSINOPHIL # BLD AUTO: 0.11 K/UL — SIGNIFICANT CHANGE UP (ref 0–0.5)
EOSINOPHIL NFR BLD AUTO: 1.3 % — SIGNIFICANT CHANGE UP (ref 0–6)
GLUCOSE SERPL-MCNC: 158 MG/DL — HIGH (ref 70–99)
HCT VFR BLD CALC: 48.8 % — HIGH (ref 34.5–45)
HGB BLD-MCNC: 15.2 G/DL — SIGNIFICANT CHANGE UP (ref 11.5–15.5)
IMM GRANULOCYTES NFR BLD AUTO: 0.2 % — SIGNIFICANT CHANGE UP (ref 0–0.9)
LYMPHOCYTES # BLD AUTO: 1 K/UL — SIGNIFICANT CHANGE UP (ref 1–3.3)
LYMPHOCYTES # BLD AUTO: 11.8 % — LOW (ref 13–44)
MCHC RBC-ENTMCNC: 31.1 GM/DL — LOW (ref 32–36)
MCHC RBC-ENTMCNC: 31.7 PG — SIGNIFICANT CHANGE UP (ref 27–34)
MCV RBC AUTO: 101.7 FL — HIGH (ref 80–100)
MONOCYTES # BLD AUTO: 0.67 K/UL — SIGNIFICANT CHANGE UP (ref 0–0.9)
MONOCYTES NFR BLD AUTO: 7.9 % — SIGNIFICANT CHANGE UP (ref 2–14)
NEUTROPHILS # BLD AUTO: 6.61 K/UL — SIGNIFICANT CHANGE UP (ref 1.8–7.4)
NEUTROPHILS NFR BLD AUTO: 78.1 % — HIGH (ref 43–77)
PLATELET # BLD AUTO: 128 K/UL — LOW (ref 150–400)
POTASSIUM SERPL-MCNC: 4.1 MMOL/L — SIGNIFICANT CHANGE UP (ref 3.5–5.3)
POTASSIUM SERPL-SCNC: 4.1 MMOL/L — SIGNIFICANT CHANGE UP (ref 3.5–5.3)
RBC # BLD: 4.8 M/UL — SIGNIFICANT CHANGE UP (ref 3.8–5.2)
RBC # FLD: 14.6 % — HIGH (ref 10.3–14.5)
SODIUM SERPL-SCNC: 143 MMOL/L — SIGNIFICANT CHANGE UP (ref 135–145)
WBC # BLD: 8.47 K/UL — SIGNIFICANT CHANGE UP (ref 3.8–10.5)
WBC # FLD AUTO: 8.47 K/UL — SIGNIFICANT CHANGE UP (ref 3.8–10.5)

## 2024-10-15 PROCEDURE — 99284 EMERGENCY DEPT VISIT MOD MDM: CPT | Mod: FS

## 2024-10-15 PROCEDURE — 99283 EMERGENCY DEPT VISIT LOW MDM: CPT | Mod: 25

## 2024-10-15 PROCEDURE — 36415 COLL VENOUS BLD VENIPUNCTURE: CPT

## 2024-10-15 PROCEDURE — 93010 ELECTROCARDIOGRAM REPORT: CPT

## 2024-10-15 PROCEDURE — 85025 COMPLETE CBC W/AUTO DIFF WBC: CPT

## 2024-10-15 PROCEDURE — 93005 ELECTROCARDIOGRAM TRACING: CPT

## 2024-10-15 PROCEDURE — 80048 BASIC METABOLIC PNL TOTAL CA: CPT

## 2024-10-15 NOTE — ED STATDOCS - PROGRESS NOTE DETAILS
75 yo female with a PMH of breast cancer, cervical disc disorder, DM, hypothyroid, hyperkalemia, s/p parathyroidectomy in 5/2024 presents with outpt lab showing K of 7.0. Pt states she had blood work today prior to her taking her lokelma. Pt denies cp, sob, abd pain, n/v, or any other complaints at this moment.   Will recheck labs, ekg and reeval. -Efrain Brown PA-C ED Attending Dr. Nunez, pt updated on results and pt in NAD.  plan dc

## 2024-10-15 NOTE — ED STATDOCS - DISCHARGE DATE
Post-operative CXR reviewed. Expected surgical changes, chest tube in adequate position. Flows 0-10     15-Oct-2024

## 2024-10-15 NOTE — ED ADULT NURSE NOTE - NSFALLUNIVINTERV_ED_ALL_ED
Bed/Stretcher in lowest position, wheels locked, appropriate side rails in place/Call bell, personal items and telephone in reach/Instruct patient to call for assistance before getting out of bed/chair/stretcher/Non-slip footwear applied when patient is off stretcher/[place] to call system/Physically safe environment - no spills, clutter or unnecessary equipment/Purposeful proactive rounding/Room/bathroom lighting operational, light cord in reach

## 2024-10-15 NOTE — ED STATDOCS - NS ED MD DISPO DISCHARGE
(THE BELOW MESSAGE IS BEING RELAYED BY THE RESULT MYCHART NOTE). Dear Candace Beatty,    The HgA1C (3 month sugar average test) was 7% (normal 4.8-5.6%). You don't have to write back.     Dr. Gonzalez [facility]

## 2024-10-15 NOTE — ED STATDOCS - PATIENT PORTAL LINK FT
You can access the FollowMyHealth Patient Portal offered by [facility] by registering at the following website: [email or web address]. By joining [facility]’s FollowMyHealth portal, you will also be able to view your health information using other applications (apps) compatible with our system.

## 2024-10-15 NOTE — ED ADULT NURSE NOTE - OBJECTIVE STATEMENT
Pt is a 74yr old female, A&OX4 and ambulatory, sent to ED for hyperkalemia (7.0) on outpatient labs. Pt asymptomatic of any s/s of hyperkalemia. States she feels normal. Denies chest pain, palpitations, and dizziness. EKG completed. Labs sent. Pt in no acute distress.

## 2024-10-15 NOTE — ED STATDOCS - NSFOLLOWUPINSTRUCTIONS_ED_ALL_ED_FT
Return to the Emergency Department for worsening or persistent symptoms, and/or ANY NEW OR CONCERNING SYMPTOMS. If you have issues obtaining follow up, please call: [phone] or [phone]  to obtain a doctor or specialist who takes your insurance in your area.

## 2024-10-15 NOTE — ED STATDOCS - CLINICAL SUMMARY MEDICAL DECISION MAKING FREE TEXT BOX
Pt with hx of hyperkalemia on Lokelma presents for elevated potassium. Pt asymptomatic. Will check labs, EKG.

## 2024-10-15 NOTE — ED STATDOCS - PRO INTERPRETER NEED 2
Gastroenteritis in Children    Your child was seen in the Emergency Department for gastroenteritis.    Viral gastroenteritis, also known as the “stomach flu,” can be caused by different viruses and often leads to vomiting, diarrhea, and fever in children.  Children with gastroenteritis are at risk of becoming dehydrated. It is important to make sure your child drinks enough fluids to keep up with the fluids they lose through vomiting and diarrhea.    There is no medication for viral gastroenteritis. The body has to fight the virus on its own. There is a vaccine against rotavirus, which is one of the viruses known to cause viral gastroenteritis.  This can prevent future illnesses, but does not help this current illness.    General tips for managing gastroenteritis at home:  -Offer your child water, low-sugar popsicles, or diluted fruit juice. Limit sugary drinks because too much sugar can worsen diarrhea. You can also give your child an oral rehydration solution (like Pedialyte), available at pharmacies and grocery stores, to help replace electrolytes.  Infants should continue to breast and bottle feed. Infants less than 4 months should NOT be given water or juice.   -Avoid spicy or fatty foods, which can worsen gastroenteritis.  -Viral gastroenteritis is very contagious between children and adults. The viruses that cause gastroenteritis can live on surfaces or in contaminated food and water. To help prevent the spread of gastroenteritis, everyone should wash their hands frequently, especially before eating. Nobody should share utensils or personal items with the child who is sick. Children should not go back to school or  until their symptoms are gone.      Follow up with your pediatrician in 1-2 days to make sure that your child is doing better.    Return to the Emergency Department if your child:  -has fever more than 5 days  -will not drink fluids or cannot keep fluids down because of vomiting  -feels light-headed or dizzy   -has muscle cramps   -has severe abdominal pain   -has signs of severe dehydration, such as no urine in 8-12 hours, dry or cracked lips or dry mouth, not making tears while crying, sunken eyes, or excessive sleepiness or weakness  -bloody or black stools or stools that look like tar English

## 2024-10-15 NOTE — ED STATDOCS - NSICDXPASTMEDICALHX_GEN_ALL_CORE_FT
PAST MEDICAL HISTORY:  Breast cancer     DM2 (diabetes mellitus, type 2)     Hyperkalemia     Polycythemia vera     S/P parathyroidectomy

## 2024-10-15 NOTE — ED ADULT TRIAGE NOTE - CHIEF COMPLAINT QUOTE
Pt presents to ED c/o elevated potassium of 7. Pt states "I have been on lokelma once a week and have been having palpitations on and off for a week." Denies chest pain, dizziness, lightheadedness at this time.

## 2024-10-15 NOTE — ED ADULT TRIAGE NOTE - ARRIVAL FROM
Topical Clindamycin Pregnancy And Lactation Text: This medication is Pregnancy Category B and is considered safe during pregnancy. It is unknown if it is excreted in breast milk. Winlevi Pregnancy And Lactation Text: This medication is considered safe during pregnancy and breastfeeding. Tetracycline Pregnancy And Lactation Text: This medication is Pregnancy Category D and not consider safe during pregnancy. It is also excreted in breast milk. Topical Retinoid counseling:  Patient advised to apply a pea-sized amount only at bedtime and wait 30 minutes after washing their face before applying.  If too drying, patient may add a non-comedogenic moisturizer. The patient verbalized understanding of the proper use and possible adverse effects of retinoids.  All of the patient's questions and concerns were addressed. Sarecycline Counseling: Patient advised regarding possible photosensitivity and discoloration of the teeth, skin, lips, tongue and gums.  Patient instructed to avoid sunlight, if possible.  When exposed to sunlight, patients should wear protective clothing, sunglasses, and sunscreen.  The patient was instructed to call the office immediately if the following severe adverse effects occur:  hearing changes, easy bruising/bleeding, severe headache, or vision changes.  The patient verbalized understanding of the proper use and possible adverse effects of sarecycline.  All of the patient's questions and concerns were addressed. Tazorac Counseling:  Patient advised that medication is irritating and drying.  Patient may need to apply sparingly and wash off after an hour before eventually leaving it on overnight.  The patient verbalized understanding of the proper use and possible adverse effects of tazorac.  All of the patient's questions and concerns were addressed. Topical Sulfur Applications Counseling: Topical Sulfur Counseling: Patient counseled that this medication may cause skin irritation or allergic reactions.  In the event of skin irritation, the patient was advised to reduce the amount of the drug applied or use it less frequently.   The patient verbalized understanding of the proper use and possible adverse effects of topical sulfur application.  All of the patient's questions and concerns were addressed. Home Aklief Pregnancy And Lactation Text: It is unknown if this medication is safe to use during pregnancy.  It is unknown if this medication is excreted in breast milk.  Breastfeeding women should use the topical cream on the smallest area of the skin for the shortest time needed while breastfeeding.  Do not apply to nipple and areola. High Dose Vitamin A Pregnancy And Lactation Text: High dose vitamin A therapy is contraindicated during pregnancy and breast feeding. Winlevi Counseling:  I discussed with the patient the risks of topical clascoterone including but not limited to erythema, scaling, itching, and stinging. Patient voiced their understanding. Include Pregnancy/Lactation Warning?: No Doxycycline Counseling:  Patient counseled regarding possible photosensitivity and increased risk for sunburn.  Patient instructed to avoid sunlight, if possible.  When exposed to sunlight, patients should wear protective clothing, sunglasses, and sunscreen.  The patient was instructed to call the office immediately if the following severe adverse effects occur:  hearing changes, easy bruising/bleeding, severe headache, or vision changes.  The patient verbalized understanding of the proper use and possible adverse effects of doxycycline.  All of the patient's questions and concerns were addressed. Topical Sulfur Applications Pregnancy And Lactation Text: This medication is Pregnancy Category C and has an unknown safety profile during pregnancy. It is unknown if this topical medication is excreted in breast milk. Azithromycin Pregnancy And Lactation Text: This medication is considered safe during pregnancy and is also secreted in breast milk. Erythromycin Counseling:  I discussed with the patient the risks of erythromycin including but not limited to GI upset, allergic reaction, drug rash, diarrhea, increase in liver enzymes, and yeast infections. Topical Retinoid Pregnancy And Lactation Text: This medication is Pregnancy Category C. It is unknown if this medication is excreted in breast milk. Spironolactone Counseling: Patient advised regarding risks of diarrhea, abdominal pain, hyperkalemia, birth defects (for female patients), liver toxicity and renal toxicity. The patient may need blood work to monitor liver and kidney function and potassium levels while on therapy. The patient verbalized understanding of the proper use and possible adverse effects of spironolactone.  All of the patient's questions and concerns were addressed. Minocycline Counseling: Patient advised regarding possible photosensitivity and discoloration of the teeth, skin, lips, tongue and gums.  Patient instructed to avoid sunlight, if possible.  When exposed to sunlight, patients should wear protective clothing, sunglasses, and sunscreen.  The patient was instructed to call the office immediately if the following severe adverse effects occur:  hearing changes, easy bruising/bleeding, severe headache, or vision changes.  The patient verbalized understanding of the proper use and possible adverse effects of minocycline.  All of the patient's questions and concerns were addressed. Tazorac Pregnancy And Lactation Text: This medication is not safe during pregnancy. It is unknown if this medication is excreted in breast milk. Bactrim Pregnancy And Lactation Text: This medication is Pregnancy Category D and is known to cause fetal risk.  It is also excreted in breast milk. Azithromycin Counseling:  I discussed with the patient the risks of azithromycin including but not limited to GI upset, allergic reaction, drug rash, diarrhea, and yeast infections. Azelaic Acid Pregnancy And Lactation Text: This medication is considered safe during pregnancy and breast feeding. Dapsone Counseling: I discussed with the patient the risks of dapsone including but not limited to hemolytic anemia, agranulocytosis, rashes, methemoglobinemia, kidney failure, peripheral neuropathy, headaches, GI upset, and liver toxicity.  Patients who start dapsone require monitoring including baseline LFTs and weekly CBCs for the first month, then every month thereafter.  The patient verbalized understanding of the proper use and possible adverse effects of dapsone.  All of the patient's questions and concerns were addressed. Tetracycline Counseling: Patient counseled regarding possible photosensitivity and increased risk for sunburn.  Patient instructed to avoid sunlight, if possible.  When exposed to sunlight, patients should wear protective clothing, sunglasses, and sunscreen.  The patient was instructed to call the office immediately if the following severe adverse effects occur:  hearing changes, easy bruising/bleeding, severe headache, or vision changes.  The patient verbalized understanding of the proper use and possible adverse effects of tetracycline.  All of the patient's questions and concerns were addressed. Patient understands to avoid pregnancy while on therapy due to potential birth defects. High Dose Vitamin A Counseling: Side effects reviewed, pt to contact office should one occur. Benzoyl Peroxide Pregnancy And Lactation Text: This medication is Pregnancy Category C. It is unknown if benzoyl peroxide is excreted in breast milk. Aklief counseling:  Patient advised to apply a pea-sized amount only at bedtime and wait 30 minutes after washing their face before applying.  If too drying, patient may add a non-comedogenic moisturizer.  The most commonly reported side effects including irritation, redness, scaling, dryness, stinging, burning, itching, and increased risk of sunburn.  The patient verbalized understanding of the proper use and possible adverse effects of retinoids.  All of the patient's questions and concerns were addressed. Azelaic Acid Counseling: Patient counseled that medicine may cause skin irritation and to avoid applying near the eyes.  In the event of skin irritation, the patient was advised to reduce the amount of the drug applied or use it less frequently.   The patient verbalized understanding of the proper use and possible adverse effects of azelaic acid.  All of the patient's questions and concerns were addressed. Doxycycline Pregnancy And Lactation Text: This medication is Pregnancy Category D and not consider safe during pregnancy. It is also excreted in breast milk but is considered safe for shorter treatment courses. Isotretinoin Pregnancy And Lactation Text: This medication is Pregnancy Category X and is considered extremely dangerous during pregnancy. It is unknown if it is excreted in breast milk. Birth Control Pills Pregnancy And Lactation Text: This medication should be avoided if pregnant and for the first 30 days post-partum. Detail Level: Zone Isotretinoin Counseling: Patient should get monthly blood tests, not donate blood, not drive at night if vision affected, not share medication, and not undergo elective surgery for 6 months after tx completed. Side effects reviewed, pt to contact office should one occur. Dapsone Pregnancy And Lactation Text: This medication is Pregnancy Category C and is not considered safe during pregnancy or breast feeding. Benzoyl Peroxide Counseling: Patient counseled that medicine may cause skin irritation and bleach clothing.  In the event of skin irritation, the patient was advised to reduce the amount of the drug applied or use it less frequently.   The patient verbalized understanding of the proper use and possible adverse effects of benzoyl peroxide.  All of the patient's questions and concerns were addressed. Erythromycin Pregnancy And Lactation Text: This medication is Pregnancy Category B and is considered safe during pregnancy. It is also excreted in breast milk. Birth Control Pills Counseling: Birth Control Pill Counseling: I discussed with the patient the potential side effects of OCPs including but not limited to increased risk of stroke, heart attack, thrombophlebitis, deep venous thrombosis, hepatic adenomas, breast changes, GI upset, headaches, and depression.  The patient verbalized understanding of the proper use and possible adverse effects of OCPs. All of the patient's questions and concerns were addressed. Spironolactone Pregnancy And Lactation Text: This medication can cause feminization of the male fetus and should be avoided during pregnancy. The active metabolite is also found in breast milk. Topical Clindamycin Counseling: Patient counseled that this medication may cause skin irritation or allergic reactions.  In the event of skin irritation, the patient was advised to reduce the amount of the drug applied or use it less frequently.   The patient verbalized understanding of the proper use and possible adverse effects of clindamycin.  All of the patient's questions and concerns were addressed. Bactrim Counseling:  I discussed with the patient the risks of sulfa antibiotics including but not limited to GI upset, allergic reaction, drug rash, diarrhea, dizziness, photosensitivity, and yeast infections.  Rarely, more serious reactions can occur including but not limited to aplastic anemia, agranulocytosis, methemoglobinemia, blood dyscrasias, liver or kidney failure, lung infiltrates or desquamative/blistering drug rashes.

## 2024-10-19 PROBLEM — E87.5 HYPERKALEMIA: Chronic | Status: INACTIVE | Noted: 2024-10-15 | Resolved: 2024-10-19

## 2024-10-19 PROBLEM — E11.9 TYPE 2 DIABETES MELLITUS WITHOUT COMPLICATIONS: Chronic | Status: INACTIVE | Noted: 2024-10-15 | Resolved: 2024-10-19

## 2024-10-19 PROBLEM — C50.919 MALIGNANT NEOPLASM OF UNSPECIFIED SITE OF UNSPECIFIED FEMALE BREAST: Chronic | Status: INACTIVE | Noted: 2024-10-15 | Resolved: 2024-10-19

## 2024-10-19 PROBLEM — D45 POLYCYTHEMIA VERA: Chronic | Status: INACTIVE | Noted: 2024-10-15 | Resolved: 2024-10-19

## 2024-10-19 PROBLEM — Z98.890 OTHER SPECIFIED POSTPROCEDURAL STATES: Chronic | Status: INACTIVE | Noted: 2024-10-15 | Resolved: 2024-10-19

## 2024-11-01 ENCOUNTER — APPOINTMENT (OUTPATIENT)
Dept: CARDIOLOGY | Facility: CLINIC | Age: 74
End: 2024-11-01

## 2024-11-12 PROBLEM — E11.9 TYPE 2 DIABETES MELLITUS WITHOUT COMPLICATIONS: Chronic | Status: ACTIVE | Noted: 2024-10-19

## 2024-11-12 PROBLEM — C50.919 MALIGNANT NEOPLASM OF UNSPECIFIED SITE OF UNSPECIFIED FEMALE BREAST: Chronic | Status: ACTIVE | Noted: 2024-10-19

## 2024-11-12 PROBLEM — E87.5 HYPERKALEMIA: Chronic | Status: ACTIVE | Noted: 2024-10-19

## 2024-11-12 PROBLEM — Z98.890 OTHER SPECIFIED POSTPROCEDURAL STATES: Chronic | Status: ACTIVE | Noted: 2024-10-19

## 2024-11-12 PROBLEM — D45 POLYCYTHEMIA VERA: Chronic | Status: ACTIVE | Noted: 2024-10-19

## 2024-11-18 ENCOUNTER — OUTPATIENT (OUTPATIENT)
Dept: OUTPATIENT SERVICES | Facility: HOSPITAL | Age: 74
LOS: 1 days | End: 2024-11-18
Payer: SELF-PAY

## 2024-11-18 ENCOUNTER — APPOINTMENT (OUTPATIENT)
Dept: CT IMAGING | Facility: CLINIC | Age: 74
End: 2024-11-18
Payer: SELF-PAY

## 2024-11-18 DIAGNOSIS — Z00.8 ENCOUNTER FOR OTHER GENERAL EXAMINATION: ICD-10-CM

## 2024-11-18 PROCEDURE — 75571 CT HRT W/O DYE W/CA TEST: CPT | Mod: 26

## 2024-11-18 PROCEDURE — 75571 CT HRT W/O DYE W/CA TEST: CPT

## 2025-02-18 ENCOUNTER — APPOINTMENT (OUTPATIENT)
Dept: ORTHOPEDIC SURGERY | Facility: CLINIC | Age: 75
End: 2025-02-18

## 2025-02-19 NOTE — ED ADULT TRIAGE NOTE - AS PAIN REST
PSR- Please offer to reschedule July appt to tomorrow 2/20 at 9:30am for 30min FRANSISCO. Thank you.   0 (no pain/absence of nonverbal indicators of pain)

## 2025-03-21 ENCOUNTER — APPOINTMENT (OUTPATIENT)
Dept: ORTHOPEDIC SURGERY | Facility: CLINIC | Age: 75
End: 2025-03-21
Payer: MEDICARE

## 2025-03-21 ENCOUNTER — APPOINTMENT (OUTPATIENT)
Dept: OBGYN | Facility: CLINIC | Age: 75
End: 2025-03-21
Payer: MEDICARE

## 2025-03-21 VITALS — WEIGHT: 166 LBS | HEIGHT: 64 IN | BODY MASS INDEX: 28.34 KG/M2

## 2025-03-21 DIAGNOSIS — Z86.59 PERSONAL HISTORY OF OTHER MENTAL AND BEHAVIORAL DISORDERS: ICD-10-CM

## 2025-03-21 DIAGNOSIS — M93.1 KIENBOCK'S DISEASE OF ADULTS: ICD-10-CM

## 2025-03-21 DIAGNOSIS — M19.031 PRIMARY OSTEOARTHRITIS, RIGHT WRIST: ICD-10-CM

## 2025-03-21 PROCEDURE — G0444 DEPRESSION SCREEN ANNUAL: CPT

## 2025-03-21 PROCEDURE — 73110 X-RAY EXAM OF WRIST: CPT | Mod: 50

## 2025-03-21 PROCEDURE — G0101: CPT | Mod: GA

## 2025-03-21 PROCEDURE — 99214 OFFICE O/P EST MOD 30 MIN: CPT

## 2025-03-21 RX ORDER — DAPAGLIFLOZIN 10 MG/1
TABLET, FILM COATED ORAL
Refills: 0 | Status: ACTIVE | COMMUNITY

## 2025-06-07 ENCOUNTER — OFFICE (OUTPATIENT)
Dept: URBAN - METROPOLITAN AREA CLINIC 70 | Facility: CLINIC | Age: 75
Setting detail: OPHTHALMOLOGY
End: 2025-06-07
Payer: MEDICARE

## 2025-06-07 DIAGNOSIS — H18.423: ICD-10-CM

## 2025-06-07 DIAGNOSIS — E11.9: ICD-10-CM

## 2025-06-07 DIAGNOSIS — H40.013: ICD-10-CM

## 2025-06-07 PROCEDURE — 92012 INTRM OPH EXAM EST PATIENT: CPT | Performed by: OPHTHALMOLOGY

## 2025-06-07 ASSESSMENT — REFRACTION_MANIFEST
OD_ADD: +2.75
OD_VA1: 20/30
OS_ADD: +2.75
OS_CYLINDER: -1.75
OD_AXIS: 110
OS_SPHERE: +0.75
OS_VA2: 20/20
OD_CYLINDER: -1.25
OD_VA2: 20/20
OD_SPHERE: +1.50
OU_VA: 20/25
OS_VA1: 20/25
OS_AXIS: 90

## 2025-06-07 ASSESSMENT — CORNEAL DYSTROPHY - BAND KERATOPATHY
OD_BANDKERATOPATHY: 2+
OS_BANDKERATOPATHY: 1+

## 2025-06-07 ASSESSMENT — VISUAL ACUITY
OD_BCVA: 20/30+
OS_BCVA: 20/25-

## 2025-06-07 ASSESSMENT — REFRACTION_AUTOREFRACTION
OS_CYLINDER: -6.25
OD_AXIS: 099
OD_CYLINDER: -1.25
OS_SPHERE: +4.50
OD_SPHERE: +4.25
OS_AXIS: 106

## 2025-06-07 ASSESSMENT — REFRACTION_CURRENTRX
OS_AXIS: 087
OD_ADD: +2.00
OS_ADD: +2.00
OD_SPHERE: +1.50
OS_OVR_VA: 20/
OD_VPRISM_DIRECTION: PROGS
OS_CYLINDER: -1.50
OD_AXIS: 093
OD_CYLINDER: -1.50
OS_SPHERE: +0.50
OD_OVR_VA: 20/
OS_VPRISM_DIRECTION: PROGS

## 2025-06-07 ASSESSMENT — KERATOMETRY
OS_K1POWER_DIOPTERS: 43.25
OD_AXISANGLE_DEGREES: 012
OS_K2POWER_DIOPTERS: 45.25
OD_K2POWER_DIOPTERS: 45.50
OS_AXISANGLE_DEGREES: 172
OD_K1POWER_DIOPTERS: 43.25

## 2025-06-07 ASSESSMENT — CONFRONTATIONAL VISUAL FIELD TEST (CVF)
OD_FINDINGS: FULL
OS_FINDINGS: FULL